# Patient Record
Sex: FEMALE | Race: AMERICAN INDIAN OR ALASKA NATIVE
[De-identification: names, ages, dates, MRNs, and addresses within clinical notes are randomized per-mention and may not be internally consistent; named-entity substitution may affect disease eponyms.]

---

## 2021-12-20 ENCOUNTER — HOSPITAL ENCOUNTER (OUTPATIENT)
Dept: HOSPITAL 5 - OR | Age: 41
Discharge: HOME | End: 2021-12-20
Attending: OBSTETRICS & GYNECOLOGY
Payer: COMMERCIAL

## 2021-12-20 VITALS — DIASTOLIC BLOOD PRESSURE: 63 MMHG | SYSTOLIC BLOOD PRESSURE: 114 MMHG

## 2021-12-20 DIAGNOSIS — N93.8: Primary | ICD-10-CM

## 2021-12-20 DIAGNOSIS — I10: ICD-10-CM

## 2021-12-20 DIAGNOSIS — D25.1: ICD-10-CM

## 2021-12-20 DIAGNOSIS — N83.8: ICD-10-CM

## 2021-12-20 DIAGNOSIS — D25.0: ICD-10-CM

## 2021-12-20 DIAGNOSIS — Z79.899: ICD-10-CM

## 2021-12-20 DIAGNOSIS — N72: ICD-10-CM

## 2021-12-20 DIAGNOSIS — Z86.718: ICD-10-CM

## 2021-12-20 DIAGNOSIS — Z98.890: ICD-10-CM

## 2021-12-20 DIAGNOSIS — D25.2: ICD-10-CM

## 2021-12-20 DIAGNOSIS — E66.9: ICD-10-CM

## 2021-12-20 LAB
BUN SERPL-MCNC: 11 MG/DL (ref 7–17)
BUN/CREAT SERPL: 18 %
CALCIUM SERPL-MCNC: 8.9 MG/DL (ref 8.4–10.2)
HCT VFR BLD CALC: 29.4 % (ref 30.3–42.9)
HCT VFR BLD CALC: 32.4 % (ref 30.3–42.9)
HEMOLYSIS INDEX: 6
HGB BLD-MCNC: 10 GM/DL (ref 10.1–14.3)
HGB BLD-MCNC: 8.4 GM/DL (ref 10.1–14.3)
MCHC RBC AUTO-ENTMCNC: 29 % (ref 30–34)
MCV RBC AUTO: 70 FL (ref 79–97)
PLATELET # BLD: 288 K/MM3 (ref 140–440)
RBC # BLD AUTO: 4.2 M/MM3 (ref 3.65–5.03)

## 2021-12-20 PROCEDURE — 85018 HEMOGLOBIN: CPT

## 2021-12-20 PROCEDURE — 58554 LAPARO-VAG HYST W/T/O COMPL: CPT

## 2021-12-20 PROCEDURE — 80048 BASIC METABOLIC PNL TOTAL CA: CPT

## 2021-12-20 PROCEDURE — 81025 URINE PREGNANCY TEST: CPT

## 2021-12-20 PROCEDURE — 36415 COLL VENOUS BLD VENIPUNCTURE: CPT

## 2021-12-20 PROCEDURE — 88307 TISSUE EXAM BY PATHOLOGIST: CPT

## 2021-12-20 PROCEDURE — 85014 HEMATOCRIT: CPT

## 2021-12-20 PROCEDURE — 86900 BLOOD TYPING SEROLOGIC ABO: CPT

## 2021-12-20 PROCEDURE — 86920 COMPATIBILITY TEST SPIN: CPT

## 2021-12-20 PROCEDURE — P9045 ALBUMIN (HUMAN), 5%, 250 ML: HCPCS

## 2021-12-20 PROCEDURE — P9016 RBC LEUKOCYTES REDUCED: HCPCS

## 2021-12-20 PROCEDURE — 86901 BLOOD TYPING SEROLOGIC RH(D): CPT

## 2021-12-20 PROCEDURE — 85027 COMPLETE CBC AUTOMATED: CPT

## 2021-12-20 PROCEDURE — 86850 RBC ANTIBODY SCREEN: CPT

## 2021-12-20 RX ADMIN — MIDAZOLAM NR MG: 1 INJECTION INTRAMUSCULAR; INTRAVENOUS at 12:31

## 2021-12-20 RX ADMIN — MIDAZOLAM NR MG: 1 INJECTION INTRAMUSCULAR; INTRAVENOUS at 12:24

## 2021-12-20 NOTE — OPERATIVE REPORT
Operative Report


Operative Report: 








Date of surgery: December 20, 2021





Admitting diagnosis: Uterine fibroids, abnormal uterine bleeding





Postoperative diagnoses: Same.





Procedure: Laparoscopically assisted vaginal hysterectomy, cystoscopy.





Surgeon: TERRIE Hager MD





Anesthesiologist: Kingston Latham MD





Anesthetist: SHAWN Nix CRNA





Anesthesia: Gen. anesthesia





EBL: 800 mL





Complications: None





Findings:Intraperitoneally, the fallopian tubes were noted to have been 

disrupted at a prior surgical session.  There was around metallic clip seen 

attached family to the anterior surface of the greater omentum.  Both ovaries 

were grossly normal.  The uterus was bulky in size.  The liver on the 

undersurface of the diaphragm were grossly normal.  There were no other 

abnormalities observed within the peritoneal cavity.





The vulva vagina and cervix were grossly normal.





Procedure in details: The patient was taken to the operating room and was given 

a general anesthesia.  The patient was then put in the lithotomy position and 

prepped in the vulva vagina and abdomen.  The drapes were placed.





A timeout was done.





With the go ahead from the anesthetist, an indwelling Chavez catheter was 

inserted.  The cervix was stabilized with a single-tooth tenaculum forceps and 

an acorn,.





At the navel, a stab incision was made.  The Veress needle was inserted, making 

sure to point the tip of this instrument into the free hollow of the pelvis.  

The Veress needle was initially aspirated and no blood was drawn.  The Veress 

needle was then flushed through with a small quantity of sterile normal saline 

without any resistance.  The general peritoneal cavity was thereafter 

insufflated with 3.5 L of carbon dioxide.  A 5 mm port with its trocar were i

nserted making sure again to point the tip of this instrument towards the free 

hollow of the pelvis.  The laparoscope confirmed successful access to the 

peritoneal cavity subsequently.  2 additional 5 mm ports were placed one for 

each flank under guidance with the laparoscope.





A general examination of the peritoneal cavity was done.  The findings are 

reported above.





The endoscopic ligation was used to excise the remnants of the fallopian tubes 

from its attachments to the ovaries all the way to the uterine cornua.  The 

broad ligament was then divided from the round ligaments and close to the 

lateral aspects of the uterus all the way to the top of the cervix.  This was 

done for both sides.  The J-hook Bovie was used to thermally coagulate the utero

peritoneal flap over the cervix.  This allowed the bladder to be displaced 

distally with the blunt probe.





The procedure continued vaginally.  The acorn cannula was removed.  A 

circumferential incision was made into the nucleus sharp on the external cervix 

distal to the palpated reflection of the urinary bladder.  The bladder was then 

bluntly dissected off of the cervix using the Kitner.  The anterior colpotomy 

was easily established allowed and retractors replaced within the opening.  The 

posterior colpotomy was established by cutting into the posterior cul-de-sac 

with the Chopra's scissors.  The blade of the weighted vaginal speculum was placed

into the posterior colpotomy.





The Enseal was then used to thermally coagulate and divide the remaining 

attachments of the uterus to the pelvic sidewall.  Specimen was removed and 

secured for histopathology.  The vagina was sewn with a running suture of #0 

Vicryl having achieved satisfactory hemostasis.  The vagina vault was 

subsequently closed with figure of 8 suture of 0 Vicryl.





Cystoscopy was done.  The patient was given 50 mg of methylene blue.  Both 

ureteric orifices were seen with urine ejecting from both.  The urine was clear.

 There was no injuries to the bladder wall.





Intraperitoneally, there was no extravasation of urine into the peritoneal 

cavity.  There was no bleeding within the peritoneal cavity.  The 

pneumoperitoneum was deflated after inspecting the access points using the 

laparoscope.  All instruments were withdrawn.  The 3 stab incisions were sealed 

with Dermabond.  All sponges and instruments were accounted for.  There were no 

complications.  The estimated blood loss was [] mL.  The patient tolerated the 

procedure well and was transferred to the recovery room in very good condition.

## 2021-12-20 NOTE — HISTORY AND PHYSICAL REPORT
History of Present Illness


Date of examination: 21


Date of admission: 


21


Chief complaint: 





Heavy periods, pelvic pains x 3yrs.


History of present illness: 





13-15 weeks sized fibroid uterus.  all vag deliveries. Desires no further 

pregnancies. Has an IUD in utero.





Past History


Past Medical History: hypertension, deep vein thrombosis


Past Surgical History: no surgical history


GYN History: fibroids





Medications and Allergies


                                    Allergies











Allergy/AdvReac Type Severity Reaction Status Date / Time


 


No Known Allergies Allergy   Unverified 21 09:19











Active Meds: 


Active Medications





Cefazolin Sodium (Cefazolin/Sterile Water 2 Gm/20 Ml Syringe)  2 gm IV PREOP NR


   Stop: 21 23:59


Lactated Ringer's (Lactated Ringers)  1,000 mls @ 100 mls/hr IV AS DIRECT CLAUDY











Review of Systems


All systems: negative





- Physical Exam


Breasts: Positive: deferred


Cardiovascular: Normal S1, Normal S2


Lungs: Positive: Normal air movement


Abdomen: Positive: normal appearance, soft, mass.  Negative: tenderness


Genitourinary (Female): Positive: normal external genitalia, normal perenium


Vulva: both: normal


Vagina: Positive: normal moisture.  Negative: discharge


Cervix: Negative: lesion, discharge


Uterus: Positive: enlarged


Adnexa: both: normal


Anus/Rectum: Positive: normal perianal skin, heme negative.  Negative: rectal 

mass, hemorrhoids


Extremities: Positive: normal


Deep Tendon Reflex Grade: Normal +2





Results


All other labs normal.








Assessment and Plan





- Patient Problems


(1) Uterine fibroid


Current Visit: Yes   Status: Acute   





(2) Abnormal uterine bleeding (AUB)


Current Visit: Yes   Status: Acute   


Plan to address problem: 


For LAVH, possible ANNA with apolinar salpingectomies.

## 2021-12-20 NOTE — ANESTHESIA CONSULTATION
Anesthesia Consult and Med Hx


Date of service: 12/20/21





- Airway


Anesthetic Teeth Evaluation: Good (BRACES)


ROM Head & Neck: Adequate


Mental/Hyoid Distance: Adequate


Mallampati Class: Class I


Intubation Access Assessment: Good





- Pre-Operative Health Status


ASA Pre-Surgery Classification: ASA2


Proposed Anesthetic Plan: General


Nerve Block: TAP if opens





- Pulmonary


Hx Smoking: No





- Cardiovascular System


Hx Hypertension: Yes





- Gastrointestinal


Hx Gastroesophageal Reflux Disease: No





- Hematic


Hx Sickle Cell Disease: No





- Other Systems


Hx Obesity: Yes